# Patient Record
Sex: FEMALE | Race: BLACK OR AFRICAN AMERICAN
[De-identification: names, ages, dates, MRNs, and addresses within clinical notes are randomized per-mention and may not be internally consistent; named-entity substitution may affect disease eponyms.]

---

## 2018-01-18 ENCOUNTER — HOSPITAL ENCOUNTER (EMERGENCY)
Dept: HOSPITAL 17 - NEPK | Age: 38
Discharge: HOME | End: 2018-01-18
Payer: COMMERCIAL

## 2018-01-18 VITALS
SYSTOLIC BLOOD PRESSURE: 142 MMHG | HEART RATE: 88 BPM | OXYGEN SATURATION: 98 % | DIASTOLIC BLOOD PRESSURE: 79 MMHG | TEMPERATURE: 98.7 F | RESPIRATION RATE: 16 BRPM

## 2018-01-18 VITALS — BODY MASS INDEX: 30.97 KG/M2 | WEIGHT: 197.31 LBS | HEIGHT: 67 IN

## 2018-01-18 DIAGNOSIS — K04.7: Primary | ICD-10-CM

## 2018-01-18 DIAGNOSIS — R07.89: ICD-10-CM

## 2018-01-18 PROCEDURE — 99283 EMERGENCY DEPT VISIT LOW MDM: CPT

## 2018-01-18 NOTE — PD
HPI


Chief Complaint:  Oral / Dental Pain or Problem


Time Seen by Provider:  17:39


Travel History


International Travel<30 days:  No


Contact w/Intl Traveler<30days:  No


Traveled to known affect area:  No





History of Present Illness


HPI


37-year-old female presents to emergency Department with complaint of right 

lower dental abscess times one week.  Denies facial edema.  Denies sore throat, 

difficult to swelling, usual drooling.  Pain radiates to her right ear.  No 

known aggravating or relieving factors.  Symptoms are mild in severity.  

Patient is also complaining of chest tightness and feeling like she can't take 

a deep breath every now and then for the past week to 2 weeks.  She said she 

was sick with allergies, sneezing and nasal congestion, and things that it is 

related to that.  She denies chest pain, shortness of breath, wheezing.  Has 

been taking over-the-counter allergy with relief of nasal congestion and 

sneezing.  Symptoms are mild in severity.  No known aggravating or relieving 

factors.  Dr. Sana Rand primary care provider.  Allergies to penicillin.  

History of asthma as a child.  Has no other medical complaints.  No other 

modifying factors or associated signs and symptoms.





PFSH


Past Medical History


Cancer:  No


Cardiovascular Problems:  Yes


Diabetes:  No (GESTATIONAL DIABETES MAR 2009)


Glaucoma:  No


Hepatitis:  No


Hiatal Hernia:  No


Hypertension:  No


Respiratory:  No


Thyroid Disease:  No


Ovarian Cysts:  Yes


Tubal Ligation:  Yes





Past Surgical History


Gynecologic Surgery:  Yes (BILAT TUBAL LIGATION APRIL 2010 )


Thoracic Surgery:  No


Other Surgery:  Yes





Social History


Alcohol Use:  Yes (ONE DRINK EVERY 4-5 MONTHS)


Tobacco Use:  No


Substance Use:  No





Allergies-Medications


(Allergen,Severity, Reaction):  


Coded Allergies:  


     penicillin G (Unverified  Allergy, Severe, 8/15/17)


Reported Meds & Prescriptions





Reported Meds & Active Scripts


Active


Ibuprofen 800 Mg Tab 800 Mg PO Q6HR PRN


Clindamycin (Clindamycin HCl) 150 Mg Cap 300 Mg PO Q6H 10 Days


Reported


Hctz (Hydrochlorothiazide) 25 Mg Tab 25 Mg PO DAILY








Review of Systems


Except as stated in HPI:  all other systems reviewed are Neg





Physical Exam


Narrative


GENERAL: Well-nourished, well-developed black female patient, in no acute 

distress; afebrile, nontoxic-appearing


SKIN: Warm and dry.


HEAD: Atraumatic. Normocephalic.  No facial edema, erythema, tenderness on 

palpation.  No lymphadenopathy.  


EYES: Pupils equal and round. No scleral icterus. No injection or drainage.


ENT: Mucosa pink and moist. No erythema or exudates. No uvular edema. No uvular

, palatal, or tonsillar deviation.  


Airway patent. 


EARS: Bilateral pinnae and external canals appear within normal limits. 

Bilateral tympanic membranes without  


erythema, dullness or perforation.


MOUTH: Mucous membranes moist, no lesions, tongue and gums appear normal.  

Right lower second molar with tenderness on palpation.  Surrounding gingiva is 

without erythema, edema, drainage.  No obvious abscess noted.  


NECK: Trachea midline.  No lymphadenopathy.  


CARDIOVASCULAR: Regular rate and rhythm.  No murmur appreciated.  


RESPIRATORY: No accessory muscle use.  Breath sounds clear and equal 

throughout.  Breath sounds equal bilaterally.  No retractions or tachypnea.


GASTROINTESTINAL: Rounded.


MUSCULOSKELETAL: No obvious deformities. No clubbing.  No cyanosis.  No edema. 


NEUROLOGICAL: Awake and alert.  Oriented 3.  No obvious cranial nerve 

deficits.  Motor grossly within normal limits. Normal speech. 


PSYCHIATRIC: Appropriate mood and affect; insight and judgment normal.





Data


Data


Last Documented VS





Vital Signs








  Date Time  Temp Pulse Resp B/P (MAP) Pulse Ox O2 Delivery O2 Flow Rate FiO2


 


1/18/18 17:19 98.7 88 16 142/79 (100) 98 Room Air  








Orders





 Orders


Ed Discharge Order (1/18/18 17:51)








[facility]


Medical Decision Making


Medical Screen Exam Complete:  Yes


Emergency Medical Condition:  Yes


Medical Record Reviewed:  Yes


Differential Diagnosis


Dentalgia, dental abscess, allergic rhinitis, chest tightness


Narrative Course


37-year-old female with dentalgia to right lower second molar and chest 

tightness.  Says she feels like she can't take a deep breath every now and 

then.  She has history of asthma as a child.  Lungs are clear and equal 

throughout.  Patient is in no acute distress without retractions or tachypnea.  

I offered to do a chest x-ray and the patient declined; states she will follow-

up with her primary care provider in regards to her complaint of chest 

tightness.  She denies chest pain, shortness of breath.  Clindamycin, ibuprofen 

prescribed for home.  Instructed patient to follow up with dentist.  Instructed 

patient to follow up with primary care provider.  Patient verbalizes 

understanding and agreement with treatment plan.  Patient is medically cleared 

and stable for discharge.  Discussed reasons to return to the emergency 

department.  Patient agrees with treatment plan.  The patients vital signs are 

stable and the patient is stable for outpatient follow-up and treatment.  

Patient discharged home, stable and in no acute distress.





Diagnosis





 Primary Impression:  


 Tooth pain


 Additional Impression:  


 Chest tightness


Referrals:  


Dentist





Primary Care Physician


Patient Instructions:  Dental Abscess (ED), General Instructions, Toothache (ED)





***Additional Instructions:  


Complete full course of antibiotics


Ibuprofen or Tylenol as directed and as needed to reduce pain and inflammation


Warm or cool compresses to the affected area


Follow-up with dentist


Follow-up with primary care provider


Return to emergency department immediately with worsening of symptoms


***Med/Other Pt SpecificInfo:  Prescription(s) given


Scripts


Ibuprofen (Ibuprofen) 800 Mg Tab


800 MG PO Q6HR Y for PAIN, #30 TAB 0 Refills


   Prov: Mervat Valle         1/18/18 


Clindamycin (Clindamycin) 150 Mg Cap


300 MG PO Q6H for Infection for 10 Days, #80 CAP 0 Refills


   Prov: Mervat Valle         1/18/18


Disposition:  01 DISCHARGE HOME


Condition:  Stable











Mervat Valle Jan 18, 2018 17:51 [address]  [address], [address]  [address] [facility]  [phone]               Thank you for choosing us for your health care visit with SAHARA Long MD.  We are glad to serve you and happy to provide you with this summar Visit [email or web address] to learn more. Educational Information     Healthy Diet and Regular Exercise  [employer] for making healthy food choices  -   Enjoy your food, but eat less.   Fully enjoy your food when ea